# Patient Record
Sex: FEMALE | Race: WHITE | NOT HISPANIC OR LATINO | Employment: FULL TIME | URBAN - METROPOLITAN AREA
[De-identification: names, ages, dates, MRNs, and addresses within clinical notes are randomized per-mention and may not be internally consistent; named-entity substitution may affect disease eponyms.]

---

## 2023-03-05 DIAGNOSIS — J40 BRONCHITIS: Primary | ICD-10-CM

## 2023-03-05 RX ORDER — AZITHROMYCIN 250 MG/1
TABLET, FILM COATED ORAL
Qty: 6 TABLET | Refills: 0 | Status: SHIPPED | OUTPATIENT
Start: 2023-03-05 | End: 2023-03-10

## 2025-06-17 ENCOUNTER — OFFICE VISIT (OUTPATIENT)
Dept: OPHTHALMOLOGY | Facility: CLINIC | Age: 50
End: 2025-06-17
Payer: COMMERCIAL

## 2025-06-17 DIAGNOSIS — H52.13 MYOPIA OF BOTH EYES: Primary | ICD-10-CM

## 2025-06-17 DIAGNOSIS — H52.4 PRESBYOPIA: ICD-10-CM

## 2025-06-17 DIAGNOSIS — H52.223 REGULAR ASTIGMATISM OF BOTH EYES: ICD-10-CM

## 2025-06-17 PROCEDURE — FLVLF CONTACT LENS EVALUATION (SP): Performed by: OPTOMETRIST

## 2025-06-17 PROCEDURE — 92015 DETERMINE REFRACTIVE STATE: CPT | Performed by: OPTOMETRIST

## 2025-06-17 PROCEDURE — 92014 COMPRE OPH EXAM EST PT 1/>: CPT | Performed by: OPTOMETRIST

## 2025-06-17 RX ORDER — ATORVASTATIN CALCIUM 20 MG/1
1 TABLET, FILM COATED ORAL
COMMUNITY
Start: 2025-01-05

## 2025-06-17 RX ORDER — TRIAMTERENE AND HYDROCHLOROTHIAZIDE 75; 50 MG/1; MG/1
0.5 TABLET ORAL DAILY
COMMUNITY

## 2025-06-17 RX ORDER — LISINOPRIL 5 MG/1
5 TABLET ORAL DAILY
COMMUNITY

## 2025-06-17 ASSESSMENT — SLIT LAMP EXAM - LIDS: COMMENTS: GOOD POSITION

## 2025-06-17 ASSESSMENT — VISUAL ACUITY
CORRECTION_TYPE: GLASSES
OS_CC: 20/20
VA_OR_OS_CURRENT_RX: 20/20
OD_CC: 20/20
VA_OR_OD_CURRENT_RX: 20/20
METHOD: SNELLEN - LINEAR
OS_CC+: -2
VA_OR_OD_CURRENT_RX: 20/20
VA_OR_OS_CURRENT_RX: 20/20

## 2025-06-17 ASSESSMENT — REFRACTION_CURRENTRX
OS_AXIS: 090
OD_BASECURVE: 8.7
OS_BASECURVE: 8.6
OS_BRAND: ACUVUE OASYS FOR ASTIGMATISM
OD_BASECURVE: 8.6
OS_DIAMETER: 14.0
OD_CYLINDER: -0.75
OD_AXIS: 100
OS_CYLINDER: SPHERE
OS_BRAND: ACUVUE OASYS FOR ASTIGMATISM
OD_BASECURVE: 8.6
OS_SPHERE: -2.00
OD_ADD: +1.50
OS_CYLINDER: -0.75
OD_DIAMETER: 14.5
OD_BRAND: ACUVUE OASYS ASTIGMATISM
OS_BRAND: BIOFINITY MULTIFOCAL
OD_SPHERE: -2.50
OD_BRAND: ACUVUE OASYS FOR ASTIGMATISM
OS_SPHERE: -1.50
OD_BASECURVE: 8.6
OS_BRAND: ACUVUE OASYS
OS_BASECURVE: 8.6
OD_SPHERE: -2.50
OD_SPHERE: -2.00
OS_CYLINDER: SPHERE
OS_AXIS: 090
OS_CYLINDER: -0.75
OD_AXIS: 100
OS_DIAMETER: 14.5
OD_BRAND: BIOFINITY TORIC MULTIFOCAL
OS_BASECURVE: 8.4
OD_CYLINDER: -0.75
OD_DIAMETER: 14.5
OS_SPHERE: -2.50
OD_AXIS: 110
OS_DIAMETER: 14.0
OS_SPHERE: -2.50
OD_BRAND: ACUVUE OASYS FOR ASTIGMATISM
OD_AXIS: 110
OD_CYLINDER: -1.75
OD_CYLINDER: -1.75
OD_DIAMETER: 14.5
OD_DIAMETER: 14.5
OS_DIAMETER: 14.5
OS_BASECURVE: 8.6
OD_SPHERE: -2.00
OS_ADD: +1.50

## 2025-06-17 ASSESSMENT — CONF VISUAL FIELD
OD_INFERIOR_NASAL_RESTRICTION: 0
OD_SUPERIOR_TEMPORAL_RESTRICTION: 0
METHOD: COUNTING FINGERS
OS_INFERIOR_TEMPORAL_RESTRICTION: 0
OD_NORMAL: 1
OD_SUPERIOR_NASAL_RESTRICTION: 0
OS_SUPERIOR_NASAL_RESTRICTION: 0
OD_INFERIOR_TEMPORAL_RESTRICTION: 0
OS_INFERIOR_NASAL_RESTRICTION: 0
OS_NORMAL: 1
OS_SUPERIOR_TEMPORAL_RESTRICTION: 0

## 2025-06-17 ASSESSMENT — ENCOUNTER SYMPTOMS
RESPIRATORY NEGATIVE: 0
PSYCHIATRIC NEGATIVE: 0
GASTROINTESTINAL NEGATIVE: 0
EYES NEGATIVE: 0
ENDOCRINE NEGATIVE: 0
ALLERGIC/IMMUNOLOGIC NEGATIVE: 0
CONSTITUTIONAL NEGATIVE: 0
CARDIOVASCULAR NEGATIVE: 0
NEUROLOGICAL NEGATIVE: 0
HEMATOLOGIC/LYMPHATIC NEGATIVE: 0
MUSCULOSKELETAL NEGATIVE: 0

## 2025-06-17 ASSESSMENT — TONOMETRY
OD_IOP_MMHG: 16-17
IOP_METHOD: GOLDMANN APPLANATION
OS_IOP_MMHG: 14

## 2025-06-17 ASSESSMENT — REFRACTION_MANIFEST
OS_CYLINDER: -1.00
OD_CYLINDER: -1.75
OD_AXIS: 100
OS_ADD: +1.25
OS_SPHERE: -2.00
OS_AXIS: 085
OD_ADD: +1.25
OD_SPHERE: -2.25

## 2025-06-17 ASSESSMENT — REFRACTION_WEARINGRX
OD_AXIS: 105
OS_AXIS: 095
OD_SPHERE: -2.50
OS_SPHERE: -2.25
OS_CYLINDER: -0.75
OD_CYLINDER: -1.25

## 2025-06-17 ASSESSMENT — CUP TO DISC RATIO
OD_RATIO: 0.25
OS_RATIO: 0.25

## 2025-06-17 ASSESSMENT — EXTERNAL EXAM - RIGHT EYE: OD_EXAM: NORMAL

## 2025-06-17 ASSESSMENT — EXTERNAL EXAM - LEFT EYE: OS_EXAM: NORMAL

## 2025-06-17 NOTE — Clinical Note
Both eyes (OU) Contact Lens Order Contact Lens Current Rx     Current Contact Lens Rx #4 (Ordered)      Brand Base Curve Diameter Sphere Cylinder Axis Add Lens Centration   Right Acuvue Oasys for Astigmatism 8.6 14.5 -2.00 -1.75 100     Left Acuvue Oasys for Astigmatism 8.6 14.5 -2.00 -0.75 090      Quantity: 4 Package: TRIAL Appointment needed? No Medically necessary? No Ship To: Home Additional instructions: Please order 2 pairs of trials and mail to patient. Thanks!

## 2026-06-22 ENCOUNTER — APPOINTMENT (OUTPATIENT)
Dept: OPHTHALMOLOGY | Facility: CLINIC | Age: 51
End: 2026-06-22
Payer: COMMERCIAL